# Patient Record
Sex: FEMALE | Race: WHITE | NOT HISPANIC OR LATINO | URBAN - METROPOLITAN AREA
[De-identification: names, ages, dates, MRNs, and addresses within clinical notes are randomized per-mention and may not be internally consistent; named-entity substitution may affect disease eponyms.]

---

## 2021-01-25 ENCOUNTER — OFFICE VISIT (OUTPATIENT)
Dept: URBAN - METROPOLITAN AREA TELEHEALTH 2 | Facility: TELEHEALTH | Age: 66
End: 2021-01-25

## 2021-03-30 ENCOUNTER — DASHBOARD ENCOUNTERS (OUTPATIENT)
Age: 66
End: 2021-03-30

## 2021-03-30 ENCOUNTER — OFFICE VISIT (OUTPATIENT)
Dept: URBAN - METROPOLITAN AREA CLINIC 12 | Facility: CLINIC | Age: 66
End: 2021-03-30
Payer: COMMERCIAL

## 2021-03-30 DIAGNOSIS — K57.30 DIVERTICULAR DISEASE OF COLON: ICD-10-CM

## 2021-03-30 DIAGNOSIS — Z95.2 AORTIC VALVE REPLACED: ICD-10-CM

## 2021-03-30 DIAGNOSIS — E66.3 OVERWEIGHT (BMI 25.0-29.9): ICD-10-CM

## 2021-03-30 DIAGNOSIS — K64.9 HEMORRHOIDS, UNSPECIFIED HEMORRHOID TYPE: ICD-10-CM

## 2021-03-30 DIAGNOSIS — R14.3 FLATULENCE: ICD-10-CM

## 2021-03-30 DIAGNOSIS — Z86.010 HX OF ADENOMATOUS COLONIC POLYPS: ICD-10-CM

## 2021-03-30 PROBLEM — 162863004: Status: ACTIVE | Noted: 2021-03-30

## 2021-03-30 PROBLEM — 1231000119100: Status: ACTIVE | Noted: 2021-03-30

## 2021-03-30 PROBLEM — 733657002: Status: ACTIVE | Noted: 2021-03-30

## 2021-03-30 PROBLEM — 249504006: Status: ACTIVE | Noted: 2021-03-30

## 2021-03-30 PROCEDURE — 99204 OFFICE O/P NEW MOD 45 MIN: CPT | Performed by: INTERNAL MEDICINE

## 2021-03-30 RX ORDER — LOSARTAN POTASSIUM 50 MG/1
1 TABLET TABLET ORAL ONCE A DAY
Status: ACTIVE | COMMUNITY

## 2021-03-30 RX ORDER — SODIUM, POTASSIUM,MAG SULFATES 17.5-3.13G
344MLL AS DIRECTED SOLUTION, RECONSTITUTED, ORAL ORAL
Qty: 1 KIT | Refills: 0 | OUTPATIENT
Start: 2021-03-30 | End: 2021-04-01

## 2021-03-30 NOTE — HPI-TODAY'S VISIT:
This is 65-year-old  woman presents for GI evaluation. She sees Dr. Amira French for primary care. Patient wants to consider the should get a colonoscopy. States that her bowel habits are currently normal. There's been no diarrhea. There has been no melena or hematochezia or abdominal pain. There's been no nausea or vomiting. There's been no unintentional weight loss. There has been no fever chills or sweats.   She states that now that she has been retired has more time to work on her body and dietary regimen exercise and has intentionally lost weight over time and is hoping to lose a little bit more. She states that she no longer has as much stress or anxiety now that she is retired from work.   Patient has had gaseousness and flatulence that has been somewhat bothersome to her. She is not currently taking probiotics.  She does have a high fiber diet in general.  She has a history of a sessile ulcerated adenomatous polyp removed from the hepatic flexure with tac 2 in that region and no remnant seen on follow up colonoscopy in 2014. She was also noted to have diverticulosis in the left colon and nonbleeding hemorrhoids which were small.  There's been no history of any recent diverticulitis or fever.   She has a history of aortic stenosis and had bovine aortic valve replacement surgery in November 2014. She has not required anticoagulation.  She has done very well and improved remarkably since that time she states. She sees her cardiologist Dr. Shawn Kent regularly once a year. There has been no chest pain or shortness of breath or palpitations or fatigue. She has been exercising regularly and goes on walks. She states that she can climb up several flights of stairs difficulty.

## 2021-03-31 PROBLEM — 429047008: Status: ACTIVE | Noted: 2021-03-30

## 2021-04-21 ENCOUNTER — OFFICE VISIT (OUTPATIENT)
Dept: URBAN - METROPOLITAN AREA SURGERY CENTER 15 | Facility: SURGERY CENTER | Age: 66
End: 2021-04-21
Payer: COMMERCIAL

## 2021-04-21 ENCOUNTER — CLAIMS CREATED FROM THE CLAIM WINDOW (OUTPATIENT)
Dept: URBAN - METROPOLITAN AREA CLINIC 4 | Facility: CLINIC | Age: 66
End: 2021-04-21
Payer: COMMERCIAL

## 2021-04-21 DIAGNOSIS — K63.5 BENIGN COLON POLYP: ICD-10-CM

## 2021-04-21 DIAGNOSIS — Z86.010 H/O ADENOMATOUS POLYP OF COLON: ICD-10-CM

## 2021-04-21 DIAGNOSIS — K63.89 STENOSIS OF ILEOCECAL VALVE: ICD-10-CM

## 2021-04-21 PROCEDURE — 88305 TISSUE EXAM BY PATHOLOGIST: CPT | Performed by: PATHOLOGY

## 2021-04-21 PROCEDURE — G8907 PT DOC NO EVENTS ON DISCHARG: HCPCS | Performed by: INTERNAL MEDICINE

## 2021-04-21 PROCEDURE — 45380 COLONOSCOPY AND BIOPSY: CPT | Performed by: INTERNAL MEDICINE
